# Patient Record
Sex: FEMALE | URBAN - METROPOLITAN AREA
[De-identification: names, ages, dates, MRNs, and addresses within clinical notes are randomized per-mention and may not be internally consistent; named-entity substitution may affect disease eponyms.]

---

## 2021-07-13 ENCOUNTER — PREPPED CHART (OUTPATIENT)
Dept: URBAN - METROPOLITAN AREA CLINIC 66 | Facility: CLINIC | Age: 61
End: 2021-07-13

## 2021-07-13 PROBLEM — H35.371 EPIRETINAL MEMBRANE: Status: STABILIZING | Noted: 2021-07-13

## 2021-07-13 PROBLEM — H25.13 NS CATARACT: Noted: 2021-07-13

## 2021-07-13 PROBLEM — D31.31 CHOROIDAL NEVUS: Status: STABILIZING | Noted: 2021-07-13

## 2021-07-13 PROBLEM — H35.3212 NEOVASCULAR AMD WITH INACTIVE CNV: Status: STABILIZING | Noted: 2021-07-13

## 2021-07-13 PROBLEM — H25.13 NUCLEAR SCLEROSIS: Status: STABILIZING | Noted: 2021-07-13

## 2021-07-13 PROBLEM — H35.371 EPIRETINAL MEMBRANE: Noted: 2021-07-13

## 2021-07-13 PROBLEM — D31.31 CHOROIDAL NEVUS: Noted: 2021-07-13

## 2021-07-13 PROBLEM — H35.3122 DRY AMD, INTERMEDIATE DRY STAGE: Noted: 2021-07-13

## 2021-07-13 PROBLEM — H43.811 POSTERIOR VITREOUS DETACHMENT: Status: STABILIZING | Noted: 2021-07-13

## 2021-07-13 PROBLEM — H35.3122 DRY AMD, INTERMEDIATE DRY STAGE: Status: STABILIZING | Noted: 2021-07-13

## 2021-07-13 PROBLEM — H35.3212 NEOVASCULAR AMD WITH INACTIVE CNV: Status: WELL-CONTROLLED | Noted: 2021-07-13

## 2021-07-13 PROBLEM — H35.3212 NEOVASCULAR AMD WITH INACTIVE CNV: Noted: 2021-07-13

## 2021-07-13 PROBLEM — H43.811 POSTERIOR VITREOUS DETACHMENT: Noted: 2021-07-13

## 2022-06-08 ASSESSMENT — VISUAL ACUITY
OD_SC: 20/30
OS_SC: 20/25-2

## 2022-06-08 ASSESSMENT — TONOMETRY
OS_IOP_MMHG: 17
OD_IOP_MMHG: 19

## 2022-07-11 ENCOUNTER — FOLLOW UP (OUTPATIENT)
Dept: URBAN - METROPOLITAN AREA CLINIC 64 | Facility: CLINIC | Age: 62
End: 2022-07-11

## 2022-07-11 DIAGNOSIS — H25.13: ICD-10-CM

## 2022-07-11 DIAGNOSIS — H35.3212: ICD-10-CM

## 2022-07-11 DIAGNOSIS — H35.3122: ICD-10-CM

## 2022-07-11 DIAGNOSIS — D31.31: ICD-10-CM

## 2022-07-11 DIAGNOSIS — H43.811: ICD-10-CM

## 2022-07-11 DIAGNOSIS — H35.371: ICD-10-CM

## 2022-07-11 PROCEDURE — 92134 CPTRZ OPH DX IMG PST SGM RTA: CPT

## 2022-07-11 PROCEDURE — 92014 COMPRE OPH EXAM EST PT 1/>: CPT

## 2022-07-11 PROCEDURE — 92202 OPSCPY EXTND ON/MAC DRAW: CPT

## 2022-07-11 ASSESSMENT — TONOMETRY
OD_IOP_MMHG: 15
OS_IOP_MMHG: 12

## 2022-07-11 ASSESSMENT — VISUAL ACUITY
OD_CC: 20/25-2
OS_CC: 20/25-3

## 2022-11-14 ENCOUNTER — FOLLOW UP (OUTPATIENT)
Dept: URBAN - METROPOLITAN AREA CLINIC 64 | Facility: CLINIC | Age: 62
End: 2022-11-14

## 2022-11-14 DIAGNOSIS — D31.31: ICD-10-CM

## 2022-11-14 DIAGNOSIS — Z96.1: ICD-10-CM

## 2022-11-14 DIAGNOSIS — H43.811: ICD-10-CM

## 2022-11-14 DIAGNOSIS — H35.371: ICD-10-CM

## 2022-11-14 DIAGNOSIS — H35.3122: ICD-10-CM

## 2022-11-14 DIAGNOSIS — H35.3212: ICD-10-CM

## 2022-11-14 PROCEDURE — 92134 CPTRZ OPH DX IMG PST SGM RTA: CPT

## 2022-11-14 PROCEDURE — 92202 OPSCPY EXTND ON/MAC DRAW: CPT

## 2022-11-14 PROCEDURE — 92014 COMPRE OPH EXAM EST PT 1/>: CPT

## 2022-11-14 ASSESSMENT — VISUAL ACUITY
OD_SC: 20/25+1
OS_SC: 20/20-2

## 2022-11-14 ASSESSMENT — TONOMETRY
OS_IOP_MMHG: 12
OD_IOP_MMHG: 16

## 2024-01-31 ENCOUNTER — TELEPHONE (OUTPATIENT)
Age: 64
End: 2024-01-31

## 2024-01-31 NOTE — TELEPHONE ENCOUNTER
Darcy from [facility] called to make appt for New PT.  Pt has blood in urine, flank pain.  Call transferred to CTS

## 2024-02-16 RX ORDER — NITROFURANTOIN MACROCRYSTALS 100 MG/1
100 CAPSULE ORAL
COMMUNITY
Start: 2024-01-26

## 2024-02-16 RX ORDER — ATENOLOL 50 MG/1
50 TABLET ORAL DAILY
COMMUNITY
Start: 2023-11-16

## 2024-02-16 RX ORDER — SIMVASTATIN 40 MG
40 TABLET ORAL
COMMUNITY
Start: 2024-01-29

## 2024-02-16 RX ORDER — ANASTROZOLE 1 MG/1
1 TABLET ORAL DAILY
COMMUNITY
Start: 2023-11-25

## 2024-02-20 ENCOUNTER — OFFICE VISIT (OUTPATIENT)
Dept: UROLOGY | Facility: CLINIC | Age: 64
End: 2024-02-20
Payer: MEDICARE

## 2024-02-20 VITALS
TEMPERATURE: 98 F | WEIGHT: 253.8 LBS | HEART RATE: 66 BPM | BODY MASS INDEX: 39.83 KG/M2 | OXYGEN SATURATION: 96 % | DIASTOLIC BLOOD PRESSURE: 76 MMHG | HEIGHT: 67 IN | SYSTOLIC BLOOD PRESSURE: 142 MMHG

## 2024-02-20 DIAGNOSIS — R31.21 ASYMPTOMATIC MICROSCOPIC HEMATURIA: Primary | ICD-10-CM

## 2024-02-20 LAB
SL AMB  POCT GLUCOSE, UA: NORMAL
SL AMB LEUKOCYTE ESTERASE,UA: NORMAL
SL AMB POCT BILIRUBIN,UA: NORMAL
SL AMB POCT BLOOD,UA: NORMAL
SL AMB POCT CLARITY,UA: CLEAR
SL AMB POCT COLOR,UA: YELLOW
SL AMB POCT KETONES,UA: NORMAL
SL AMB POCT NITRITE,UA: NORMAL
SL AMB POCT PH,UA: 7.5
SL AMB POCT SPECIFIC GRAVITY,UA: 1.01
SL AMB POCT URINE PROTEIN: NORMAL
SL AMB POCT UROBILINOGEN: 0.2

## 2024-02-20 PROCEDURE — 88112 CYTOPATH CELL ENHANCE TECH: CPT | Performed by: STUDENT IN AN ORGANIZED HEALTH CARE EDUCATION/TRAINING PROGRAM

## 2024-02-20 PROCEDURE — 99203 OFFICE O/P NEW LOW 30 MIN: CPT | Performed by: UROLOGY

## 2024-02-20 PROCEDURE — 81003 URINALYSIS AUTO W/O SCOPE: CPT | Performed by: UROLOGY

## 2024-02-20 RX ORDER — CHOLECALCIFEROL (VITAMIN D3) 10(400)/ML
DROPS ORAL
COMMUNITY

## 2024-02-20 RX ORDER — OMEGA-3/DHA/EPA/FISH OIL 300-1000MG
CAPSULE ORAL
COMMUNITY

## 2024-02-20 RX ORDER — MULTIVITAMIN,THER AND MINERALS
1 TABLET ORAL EVERY EVENING
COMMUNITY

## 2024-02-20 RX ORDER — TOBRAMYCIN 3 MG/ML
1 SOLUTION/ DROPS OPHTHALMIC
COMMUNITY
Start: 2024-02-07

## 2024-02-20 RX ORDER — ASPIRIN 81 MG/1
81 TABLET ORAL DAILY
COMMUNITY

## 2024-02-20 NOTE — PROGRESS NOTES
UROLOGY PROGRESS NOTE         NAME: Annemarie Bryan  AGE: 63 y.o. SEX: female  : 1960   MRN: [id number]    DATE: 2024  TIME: 9:52 AM    Assessment and Plan      Impression:   1. Asymptomatic microscopic hematuria  -     POCT urine dip auto non-scope    Patient had 1 episode of gross hematuria with some right-sided lower quadrant pain that has resolved.  As of workup and risk of malignancy discussed.  Remote smoking minimal history.     Plan: Cystoscopy, cytology, CT scan with and without contrast.      Chief Complaint     Chief Complaint   Patient presents with    new pt    Hematuria    pain to right side of abdomen     History of Present Illness     HPI: Annemarie Bryan is a 63 y.o. year old female who presents with history of gross hematuria.  She has no significant voiding dysfunction.  At the time of the hematuria she had right lower quadrant pain urine culture was negative.  She had a noncontrast CAT scan done in the past which did not reveal any  abnormalities.  She has a remote smoking history 20 years ago where she did not inhale over 2-year period with minimal exposure.  No significant frequency urgency no  history otherwise.  Currently she gets up twice a night to void.  She voids every 2-5 hours during the day.  History of breast cancer status post radiation.  No chemo.            The following portions of the patient's history were reviewed and updated as appropriate: allergies, current medications, past family history, past medical history, past social history, past surgical history and problem list.  Past Medical History:   Diagnosis Date    Breast cancer (HCC)     Hematuria     Hiatal hernia     HTN (hypertension)     Macular degeneration     JUAN DAVID (obstructive sleep apnea)     Umbilical hernia      Past Surgical History:   Procedure Laterality Date     SECTION      HYSTERECTOMY       shoulder  Review of Systems     Const: Denies chills, fever and weight loss.  CV: Denies  "chest pain.  Resp: Denies SOB.  GI: Denies abdominal pain, nausea and vomiting.  : Denies symptoms other than stated above.  Musculo: Denies back pain.    Objective   /76   Pulse 66   Temp 98 °F (36.7 °C)   Ht 5' 7\" (1.702 m)   Wt 115 kg (253 lb 12.8 oz)   SpO2 96%   BMI 39.75 kg/m²     Physical Exam  Const: Appears healthy and well developed. No signs of acute distress present.  Resp: Respirations are regular and unlabored.   CV: Rate is regular. Rhythm is regular.  Abdomen: Abdomen is soft, nontender, and nondistended. Kidneys are not palpable.  : No CVA tenderness no suprapubic or lower quadrant tenderness today.  Psych: Patient's attitude is cooperative. Mood is normal. Affect is normal.    Current Medications     Current Outpatient Medications:     anastrozole (ARIMIDEX) 1 mg tablet, Take 1 mg by mouth daily, Disp: , Rfl:     aspirin (ECOTRIN LOW STRENGTH) 81 mg EC tablet, Take 81 mg by mouth daily, Disp: , Rfl:     atenolol (TENORMIN) 50 mg tablet, Take 50 mg by mouth daily, Disp: , Rfl:     cholecalciferol (VITAMIN D) 400 units/1 mL, Take by mouth, Disp: , Rfl:     fish oil-omega-3 fatty acids 1000 MG capsule, Take by mouth, Disp: , Rfl:     nitrofurantoin (MACRODANTIN) 100 mg capsule, Take 100 mg by mouth daily at bedtime, Disp: , Rfl:     simvastatin (ZOCOR) 40 mg tablet, Take 40 mg by mouth daily at bedtime, Disp: , Rfl:     tobramycin (TOBREX) 0.3 % SOLN, Administer 1 drop to both eyes every 4 (four) hours while awake, Disp: , Rfl:     Vitamins/Minerals TABS, Take 1 tablet by mouth every evening, Disp: , Rfl:         Frank D'Amico, MD        "

## 2024-02-23 PROCEDURE — 88112 CYTOPATH CELL ENHANCE TECH: CPT | Performed by: STUDENT IN AN ORGANIZED HEALTH CARE EDUCATION/TRAINING PROGRAM

## 2024-02-26 ENCOUNTER — APPOINTMENT (OUTPATIENT)
Dept: LAB | Facility: HOSPITAL | Age: 64
End: 2024-02-26
Payer: MEDICARE

## 2024-02-26 ENCOUNTER — HOSPITAL ENCOUNTER (OUTPATIENT)
Dept: CT IMAGING | Facility: HOSPITAL | Age: 64
Discharge: HOME | End: 2024-02-26
Attending: UROLOGY
Payer: MEDICARE

## 2024-02-26 DIAGNOSIS — R31.21 ASYMPTOMATIC MICROSCOPIC HEMATURIA: ICD-10-CM

## 2024-02-26 DIAGNOSIS — R31.21 ASYMPTOMATIC MICROSCOPIC HEMATURIA: Primary | ICD-10-CM

## 2024-02-26 LAB
BUN SERPL-MCNC: 18 MG/DL (ref 5–25)
CREAT SERPL-MCNC: 0.94 MG/DL (ref 0.6–1.3)
GFR SERPL CREATININE-BSD FRML MDRD: 63 ML/MIN/1.73SQ M

## 2024-02-26 PROCEDURE — 36415 COLL VENOUS BLD VENIPUNCTURE: CPT

## 2024-02-26 PROCEDURE — 74178 CT ABD&PLV WO CNTR FLWD CNTR: CPT

## 2024-02-26 PROCEDURE — G1004 CDSM NDSC: HCPCS

## 2024-02-26 PROCEDURE — 82565 ASSAY OF CREATININE: CPT

## 2024-02-26 PROCEDURE — 84520 ASSAY OF UREA NITROGEN: CPT

## 2024-02-26 RX ADMIN — IOHEXOL 100 ML: 350 INJECTION, SOLUTION INTRAVENOUS at 14:07

## 2024-04-02 ENCOUNTER — TELEPHONE (OUTPATIENT)
Age: 64
End: 2024-04-02

## 2024-04-02 ENCOUNTER — PROCEDURE VISIT (OUTPATIENT)
Dept: UROLOGY | Facility: CLINIC | Age: 64
End: 2024-04-02
Payer: MEDICARE

## 2024-04-02 VITALS
HEART RATE: 66 BPM | DIASTOLIC BLOOD PRESSURE: 88 MMHG | SYSTOLIC BLOOD PRESSURE: 160 MMHG | OXYGEN SATURATION: 98 % | RESPIRATION RATE: 20 BRPM | BODY MASS INDEX: 39.52 KG/M2 | WEIGHT: 251.8 LBS | HEIGHT: 67 IN | TEMPERATURE: 98.4 F

## 2024-04-02 DIAGNOSIS — N39.0 URINARY TRACT INFECTION WITH HEMATURIA, SITE UNSPECIFIED: Primary | ICD-10-CM

## 2024-04-02 DIAGNOSIS — R31.9 URINARY TRACT INFECTION WITH HEMATURIA, SITE UNSPECIFIED: Primary | ICD-10-CM

## 2024-04-02 LAB
SL AMB  POCT GLUCOSE, UA: NEGATIVE
SL AMB LEUKOCYTE ESTERASE,UA: ABNORMAL
SL AMB POCT BILIRUBIN,UA: NEGATIVE
SL AMB POCT BLOOD,UA: NEGATIVE
SL AMB POCT CLARITY,UA: CLEAR
SL AMB POCT COLOR,UA: YELLOW
SL AMB POCT KETONES,UA: NEGATIVE
SL AMB POCT NITRITE,UA: NEGATIVE
SL AMB POCT PH,UA: 7
SL AMB POCT SPECIFIC GRAVITY,UA: 1.02
SL AMB POCT URINE PROTEIN: NEGATIVE
SL AMB POCT UROBILINOGEN: 0.2

## 2024-04-02 PROCEDURE — 52000 CYSTOURETHROSCOPY: CPT | Performed by: UROLOGY

## 2024-04-02 PROCEDURE — 81003 URINALYSIS AUTO W/O SCOPE: CPT | Performed by: UROLOGY

## 2024-04-02 NOTE — TELEPHONE ENCOUNTER
Spoke to patient and informed her that she was given one dose of antibiotic in office. No more was ordered. Patient understood

## 2024-04-02 NOTE — PROGRESS NOTES
"UROLOGY PROGRESS NOTE         NAME: Annemarie Bryan  AGE: 63 y.o. SEX: female  : 1960   MRN: [id number]    DATE: 2024  TIME: 9:02 AM    Assessment and Plan      Impression:   1. Urinary tract infection with hematuria, site unspecified  -     POCT urine dip auto non-scope  -     Cystoscopy      Negative workup for hematuria.  Negative CAT scan negative cytology normal blood work normal exam.  Normal cystoscopy.   Plan: She will follow-up on appearing basis.  She continues to have mild right lower quadrant pain.  She will discuss this for consideration of another colonoscopy.      Chief Complaint     Chief Complaint   Patient presents with    Cystoscopy     History of Present Illness     HPI: Annemarie Bryna is a 63 y.o. year old female who presents with here for completion of her workup for microhematuria, gross hematuria, right lower quadrant pain  CT negative, cytology negative, blood work negative, exam negative,    See separate cystoscopy note-normal              The following portions of the patient's history were reviewed and updated as appropriate: allergies, current medications, past family history, past medical history, past social history, past surgical history and problem list.  Past Medical History:   Diagnosis Date    Breast cancer (HCC)     Hematuria     Hiatal hernia     HTN (hypertension)     Macular degeneration     JUAN DAVID (obstructive sleep apnea)     Umbilical hernia      Past Surgical History:   Procedure Laterality Date     SECTION      HYSTERECTOMY       shoulder  Review of Systems     Const: Denies chills, fever and weight loss.  CV: Denies chest pain.  Resp: Denies SOB.  GI: Denies abdominal pain, nausea and vomiting.  : Denies symptoms other than stated above.  Musculo: Denies back pain.    Objective   /88   Pulse 66   Temp 98.4 °F (36.9 °C)   Resp 20   Ht 5' 7\" (1.702 m)   Wt 114 kg (251 lb 12.8 oz)   SpO2 98%   BMI 39.44 kg/m²     Physical Exam  Const: " Appears healthy and well developed. No signs of acute distress present.  Resp: Respirations are regular and unlabored.   CV: Rate is regular. Rhythm is regular.  Abdomen: Abdomen is soft, nontender, and nondistended. Kidneys are not palpable.  : Pelvic exam reveals mild atrophic changes.  No palpable masses tissue soft  Psych: Patient's attitude is cooperative. Mood is normal. Affect is normal.    Current Medications     Current Outpatient Medications:     anastrozole (ARIMIDEX) 1 mg tablet, Take 1 mg by mouth daily, Disp: , Rfl:     aspirin (ECOTRIN LOW STRENGTH) 81 mg EC tablet, Take 81 mg by mouth daily, Disp: , Rfl:     atenolol (TENORMIN) 50 mg tablet, Take 50 mg by mouth daily, Disp: , Rfl:     cholecalciferol (VITAMIN D) 400 units/1 mL, Take by mouth, Disp: , Rfl:     fish oil-omega-3 fatty acids 1000 MG capsule, Take by mouth, Disp: , Rfl:     simvastatin (ZOCOR) 40 mg tablet, Take 40 mg by mouth daily at bedtime, Disp: , Rfl:     Vitamins/Minerals TABS, Take 1 tablet by mouth every evening, Disp: , Rfl:         Frank D'Amico, MD

## 2024-04-02 NOTE — TELEPHONE ENCOUNTER
Pt was seen today by Dr. D'Amico    PT stated that an antibiotic was supposed to be sent in to the pharmacy.  Pt is at the pharmacy now.      The pharmacy has not received this yet.  Please call pt back at [phone]

## 2024-04-02 NOTE — PROGRESS NOTES
"   Cystoscopy     Date/Time  4/2/2024 8:30 AM     Performed by  Frank D'Amico, MD   Authorized by  Frank D'Amico, MD     Universal Protocol:  Risks and benefits: risks, benefits and alternatives were discussed  Consent given by: patient  Time out: Immediately prior to procedure a \"time out\" was called to verify the correct patient, procedure, equipment, support staff and site/side marked as required.  Timeout called at: 4/2/2024 9:00 AM.  Patient identity confirmed: verbally with patient      Procedure Details:  Procedure type: cystoscopy    Patient tolerance: Patient tolerated the procedure well with no immediate complications    Additional Procedure Details: Patient underwent flexible digital cystoscopy.  She was able to visualize the findings.  1 dose of Keflex.  Prepped and draped.  Lidocaine jelly given.  Dorsolithotomy position.  This revealed a normal urethra.  She had some mild inflammatory changes at the trigone.  There were no tumors noted.  No erythema noted.  Both orifices effluxed clear urine.  There were no stones in the bladder.  There were no diverticuli.  Patient essentially had a normal.  She also has a normal pelvic exam.  Mild atrophic changes.  Tolerated the procedure well      "